# Patient Record
Sex: MALE | Race: WHITE | Employment: STUDENT | URBAN - METROPOLITAN AREA
[De-identification: names, ages, dates, MRNs, and addresses within clinical notes are randomized per-mention and may not be internally consistent; named-entity substitution may affect disease eponyms.]

---

## 2023-04-28 ENCOUNTER — HOSPITAL ENCOUNTER (EMERGENCY)
Facility: HOSPITAL | Age: 23
Discharge: HOME | End: 2023-04-28
Attending: EMERGENCY MEDICINE
Payer: MEDICAID

## 2023-04-28 VITALS
HEART RATE: 84 BPM | RESPIRATION RATE: 23 BRPM | WEIGHT: 185 LBS | DIASTOLIC BLOOD PRESSURE: 93 MMHG | BODY MASS INDEX: 27.4 KG/M2 | TEMPERATURE: 98 F | OXYGEN SATURATION: 98 % | HEIGHT: 69 IN | SYSTOLIC BLOOD PRESSURE: 137 MMHG

## 2023-04-28 DIAGNOSIS — E87.6 HYPOKALEMIA: Primary | ICD-10-CM

## 2023-04-28 DIAGNOSIS — T43.641A: ICD-10-CM

## 2023-04-28 LAB
ALBUMIN SERPL-MCNC: 4.8 G/DL (ref 3.4–5)
ALBUMIN/GLOB SERPL: 1.2 {RATIO} (ref 1–2)
ALP LIVER SERPL-CCNC: 76 U/L
ALT SERPL-CCNC: 59 U/L
ANION GAP SERPL CALC-SCNC: 12 MMOL/L (ref 0–18)
AST SERPL-CCNC: 39 U/L (ref 15–37)
ATRIAL RATE: 95 BPM
BASOPHILS # BLD AUTO: 0.07 X10(3) UL (ref 0–0.2)
BASOPHILS NFR BLD AUTO: 0.7 %
BILIRUB SERPL-MCNC: 2.1 MG/DL (ref 0.1–2)
BUN BLD-MCNC: 15 MG/DL (ref 7–18)
BUN/CREAT SERPL: 12.4 (ref 10–20)
CALCIUM BLD-MCNC: 9.8 MG/DL (ref 8.5–10.1)
CHLORIDE SERPL-SCNC: 102 MMOL/L (ref 98–112)
CO2 SERPL-SCNC: 27 MMOL/L (ref 21–32)
CREAT BLD-MCNC: 1.21 MG/DL
DEPRECATED RDW RBC AUTO: 38.9 FL (ref 35.1–46.3)
EOSINOPHIL # BLD AUTO: 0.03 X10(3) UL (ref 0–0.7)
EOSINOPHIL NFR BLD AUTO: 0.3 %
ERYTHROCYTE [DISTWIDTH] IN BLOOD BY AUTOMATED COUNT: 12.3 % (ref 11–15)
GFR SERPLBLD BASED ON 1.73 SQ M-ARVRAT: 87 ML/MIN/1.73M2 (ref 60–?)
GLOBULIN PLAS-MCNC: 4.1 G/DL (ref 2.8–4.4)
GLUCOSE BLD-MCNC: 101 MG/DL (ref 70–99)
GLUCOSE BLDC GLUCOMTR-MCNC: 102 MG/DL (ref 70–99)
HCT VFR BLD AUTO: 50.9 %
HGB BLD-MCNC: 17.5 G/DL
IMM GRANULOCYTES # BLD AUTO: 0.04 X10(3) UL (ref 0–1)
IMM GRANULOCYTES NFR BLD: 0.4 %
LYMPHOCYTES # BLD AUTO: 2.98 X10(3) UL (ref 1–4)
LYMPHOCYTES NFR BLD AUTO: 28.3 %
MAGNESIUM SERPL-MCNC: 2.3 MG/DL (ref 1.6–2.6)
MCH RBC QN AUTO: 29.9 PG (ref 26–34)
MCHC RBC AUTO-ENTMCNC: 34.4 G/DL (ref 31–37)
MCV RBC AUTO: 87 FL
MONOCYTES # BLD AUTO: 0.97 X10(3) UL (ref 0.1–1)
MONOCYTES NFR BLD AUTO: 9.2 %
NEUTROPHILS # BLD AUTO: 6.45 X10 (3) UL (ref 1.5–7.7)
NEUTROPHILS # BLD AUTO: 6.45 X10(3) UL (ref 1.5–7.7)
NEUTROPHILS NFR BLD AUTO: 61.1 %
OSMOLALITY SERPL CALC.SUM OF ELEC: 293 MOSM/KG (ref 275–295)
P AXIS: 40 DEGREES
P-R INTERVAL: 130 MS
PLATELET # BLD AUTO: 362 10(3)UL (ref 150–450)
POTASSIUM SERPL-SCNC: 2.9 MMOL/L (ref 3.5–5.1)
PROT SERPL-MCNC: 8.9 G/DL (ref 6.4–8.2)
Q-T INTERVAL: 332 MS
QRS DURATION: 78 MS
QTC CALCULATION (BEZET): 417 MS
R AXIS: 75 DEGREES
RBC # BLD AUTO: 5.85 X10(6)UL
SODIUM SERPL-SCNC: 141 MMOL/L (ref 136–145)
T AXIS: 46 DEGREES
VENTRICULAR RATE: 95 BPM
WBC # BLD AUTO: 10.5 X10(3) UL (ref 4–11)

## 2023-04-28 PROCEDURE — 82962 GLUCOSE BLOOD TEST: CPT

## 2023-04-28 PROCEDURE — 93010 ELECTROCARDIOGRAM REPORT: CPT

## 2023-04-28 PROCEDURE — 80053 COMPREHEN METABOLIC PANEL: CPT | Performed by: EMERGENCY MEDICINE

## 2023-04-28 PROCEDURE — 85025 COMPLETE CBC W/AUTO DIFF WBC: CPT | Performed by: EMERGENCY MEDICINE

## 2023-04-28 PROCEDURE — 99284 EMERGENCY DEPT VISIT MOD MDM: CPT

## 2023-04-28 PROCEDURE — 80053 COMPREHEN METABOLIC PANEL: CPT

## 2023-04-28 PROCEDURE — 85025 COMPLETE CBC W/AUTO DIFF WBC: CPT

## 2023-04-28 PROCEDURE — 83735 ASSAY OF MAGNESIUM: CPT | Performed by: EMERGENCY MEDICINE

## 2023-04-28 PROCEDURE — 93005 ELECTROCARDIOGRAM TRACING: CPT

## 2023-04-28 PROCEDURE — 96360 HYDRATION IV INFUSION INIT: CPT

## 2023-04-28 RX ORDER — POTASSIUM CHLORIDE 20 MEQ/1
20 TABLET, EXTENDED RELEASE ORAL 2 TIMES DAILY
Qty: 6 TABLET | Refills: 0 | Status: SHIPPED | OUTPATIENT
Start: 2023-04-28 | End: 2023-05-01

## 2023-04-28 RX ORDER — POTASSIUM CHLORIDE 20 MEQ/1
40 TABLET, EXTENDED RELEASE ORAL ONCE
Status: COMPLETED | OUTPATIENT
Start: 2023-04-28 | End: 2023-04-28

## 2023-04-28 RX ORDER — POTASSIUM CHLORIDE 14.9 MG/ML
20 INJECTION INTRAVENOUS ONCE
Status: DISCONTINUED | OUTPATIENT
Start: 2023-04-28 | End: 2023-04-28

## 2023-04-28 NOTE — ED QUICK NOTES
This RN inserted PIV in patient, pt had vasovagal episode became pale, diaphoretic and requesting for water d/t thirsty, No LOC BP 95/55, HR went from 113 to 77. Pt remained alert and awake, IV fluids initiated  Per Dr. Geetha Townsend &notified. Pt also states he hasn't eaten since Wednesday, food order placed,  per accu check.

## 2023-04-28 NOTE — ED INITIAL ASSESSMENT (HPI)
Pt ambulatory to ED A&O x 4, extremely anxious, reports he took ecstasy on Thurs @ approx 0400. Pt stating, \"I think it was laced with something, I've never felt this way before after taking ecstasy. \"  (+) nausea, denies emesis, chest pain, shortness of breath.